# Patient Record
Sex: FEMALE | URBAN - METROPOLITAN AREA
[De-identification: names, ages, dates, MRNs, and addresses within clinical notes are randomized per-mention and may not be internally consistent; named-entity substitution may affect disease eponyms.]

---

## 2017-06-02 ENCOUNTER — APPOINTMENT (RX ONLY)
Dept: URBAN - METROPOLITAN AREA CLINIC 148 | Facility: CLINIC | Age: 27
Setting detail: DERMATOLOGY
End: 2017-06-02

## 2017-06-02 DIAGNOSIS — L70.0 ACNE VULGARIS: ICD-10-CM

## 2017-06-02 PROCEDURE — 99202 OFFICE O/P NEW SF 15 MIN: CPT

## 2017-06-02 PROCEDURE — ? PRESCRIPTION

## 2017-06-02 PROCEDURE — ? COUNSELING

## 2017-06-02 RX ORDER — DOXYCYCLINE HYCLATE 50 MG/1
CAPSULE, GELATIN COATED ORAL
Qty: 120 | Refills: 1 | Status: ERX | COMMUNITY
Start: 2017-06-02

## 2017-06-02 RX ORDER — TRETINOIN 0.25 MG/G
CREAM TOPICAL AT BEDTIME
Qty: 1 | Refills: 1 | Status: ERX | COMMUNITY
Start: 2017-06-02

## 2017-06-02 RX ORDER — BENZOYL PEROXIDE 5 %
GEL (GRAM) TOPICAL EVERY MORNING
Qty: 1 | Refills: 1 | Status: ERX | COMMUNITY
Start: 2017-06-02

## 2017-06-02 RX ORDER — BENZOYL PEROXIDE 100 MG/ML
LIQUID TOPICAL TWICE DAILY
Qty: 1 | Refills: 1 | Status: ERX | COMMUNITY
Start: 2017-06-02

## 2017-06-02 RX ADMIN — TRETINOIN: 0.25 CREAM TOPICAL at 17:27

## 2017-06-02 RX ADMIN — BENZOYL PEROXIDE: 100 LIQUID TOPICAL at 17:27

## 2017-06-02 RX ADMIN — Medication: at 17:27

## 2017-06-02 RX ADMIN — DOXYCYCLINE HYCLATE: 50 CAPSULE, GELATIN COATED ORAL at 17:27

## 2017-06-02 ASSESSMENT — LOCATION DETAILED DESCRIPTION DERM
LOCATION DETAILED: INFERIOR MID FOREHEAD
LOCATION DETAILED: RIGHT INFERIOR CENTRAL MALAR CHEEK

## 2017-06-02 ASSESSMENT — LOCATION ZONE DERM: LOCATION ZONE: FACE

## 2017-06-02 ASSESSMENT — LOCATION SIMPLE DESCRIPTION DERM
LOCATION SIMPLE: RIGHT CHEEK
LOCATION SIMPLE: INFERIOR FOREHEAD

## 2017-06-02 NOTE — HPI: PIMPLES (ACNE)
How Severe Is Your Acne?: severe
Is This A New Presentation, Or A Follow-Up?: Acne
Females Only: When Was Your Last Menstrual Period?: 5/4/201